# Patient Record
Sex: FEMALE | URBAN - METROPOLITAN AREA
[De-identification: names, ages, dates, MRNs, and addresses within clinical notes are randomized per-mention and may not be internally consistent; named-entity substitution may affect disease eponyms.]

---

## 2023-01-09 ENCOUNTER — OFFICE VISIT (OUTPATIENT)
Dept: URBAN - METROPOLITAN AREA CLINIC 30 | Facility: CLINIC | Age: 28
End: 2023-01-09
Payer: COMMERCIAL

## 2023-01-09 DIAGNOSIS — H43.813 VITREOUS DEGENERATION, BILATERAL: Primary | ICD-10-CM

## 2023-01-09 DIAGNOSIS — G43.109 MIGRAINE W/ AURA: ICD-10-CM

## 2023-01-09 DIAGNOSIS — H52.13 MYOPIA, BILATERAL: ICD-10-CM

## 2023-01-09 PROCEDURE — 92134 CPTRZ OPH DX IMG PST SGM RTA: CPT

## 2023-01-09 PROCEDURE — 92004 COMPRE OPH EXAM NEW PT 1/>: CPT

## 2023-01-09 ASSESSMENT — KERATOMETRY
OD: 47.40
OS: 47.12

## 2023-01-09 ASSESSMENT — VISUAL ACUITY
OS: 20/20
OD: 20/20

## 2023-01-09 ASSESSMENT — INTRAOCULAR PRESSURE
OD: 19
OS: 19

## 2023-01-09 NOTE — IMPRESSION/PLAN
Impression: Migraine w/ aura: G43.109. Plan: Pt reports h/o ocular migraine, pressure sensation at forehead and around eyes. Has not been treated for migraine in past, recommend bring up with PCP.